# Patient Record
Sex: FEMALE | Race: WHITE | NOT HISPANIC OR LATINO | Employment: PART TIME | URBAN - METROPOLITAN AREA
[De-identification: names, ages, dates, MRNs, and addresses within clinical notes are randomized per-mention and may not be internally consistent; named-entity substitution may affect disease eponyms.]

---

## 2017-01-11 ENCOUNTER — ALLSCRIPTS OFFICE VISIT (OUTPATIENT)
Dept: OTHER | Facility: OTHER | Age: 22
End: 2017-01-11

## 2018-01-10 NOTE — MISCELLANEOUS
Message  Pt called office asking if she could move her 32 week u/s from this thursday to today given some pains shes been having  Pt states she has been having stabbing abdominal pain since Saturday  Pt was unable to describe location specifically saying " they are all over"  Also had difficulty describing how often  Pt denies any other Sx  Per CG pt to be seen in triage  L&D notified  Active Problems    1  1st trimester screening (V28 89) (Z36)   2  Depression with anxiety (300 4) (F41 8)   3  Encounter for fetal anatomic survey (V28 81) (Z36)   4  Encounter for screening for risk of pre-term labor (V28 82) (Z36)   5  Headache (784 0) (R51)   6  Pregnancy, first, obstetrical care (V22 0) (Z34 00)   7  Prenatal care in third trimester (V22 1) (Z34 93)    Current Meds   1  [facility] Prenatal Gummy 0 4-113 5 MG Oral Tablet Chewable; TAKE 2 TABLET Daily per pt; Therapy: (Recorded:[id number]) to Recorded    Allergies    1  No Known Drug Allergies    2  No Known Environmental Allergies   3   No Known Food Allergies    Signatures   Electronically signed by : Azalea Woody, ; Jan 11 2016 10:10AM EST                       (Author)

## 2018-01-13 NOTE — MISCELLANEOUS
Provider Comments  Provider Comments:   called patient and lmtcb and r/s      Signatures   Electronically signed by :  Brittany Menjivar, ; Jan 11 2017  2:49PM EST                       (Author)

## 2018-01-17 NOTE — CONSULTS
I had the pleasure of evaluating your patient, Josiah Sam  My full evaluation follows:      Chief Complaint  Here for ultrasound study      History of Present Illness  Please refer to the ultrasound report for information related to the patient's history      Active Problems    1  1st trimester screening (V28 89) (Z36)   2  Depression with anxiety (300 4) (F41 8)   3  Encounter for fetal anatomic survey (V28 81) (Z36)   4  Encounter for screening for risk of pre-term labor (V28 82) (Z36)   5  Headache (784 0) (R51)   6  Pregnancy, first, obstetrical care (V22 0) (Z34 00)   7  Prenatal care in third trimester (V22 1) (Z34 93)    Surgical History    · History of Jaw Surgery    Family History    · Family history of Kidney infection   · Family history of Sepsis due to Staphylococcus aureus with acute renal failure    Social History    · Always uses seat belt   ·    · Never a smoker    Current Meds   1  [facility]S Prenatal Gummy 0 4-113 5 MG Oral Tablet Chewable; TAKE 2 TABLET Daily per pt; Therapy: (Recorded:[id number]) to Recorded    Allergies    1  No Known Drug Allergies    2  No Known Environmental Allergies   3  No Known Food Allergies    Vitals   Recorded: [id number] 50:45YO   Systolic 820, LUE, Sitting   Diastolic 66, LUE, Sitting   Height 5 ft 2 in   2-20 Stature Percentile 19 %   Weight 158 lb 9 6 oz   2-20 Weight Percentile 87 %   BMI Calculated 29 01   BMI Percentile 92 %   BSA Calculated 1 73   Pain Scale 0     Results/Data  Exam description: level I obstetrical ultrasound  Findings: no fetal abnormalities seen  Discussion/Summary    Please refer to the ultrasound report for additional information  The patient was counseled regarding diagnostic results, instructions for management, prognosis, impressions  Thank you very much for allowing me to participate in the care of this patient  If you have any questions, please do not hesitate to contact me        Future Appointments    Signatures Electronically signed by : ASHLEY Walsh ; Jan 14 2016 10:52AM EST                       (Author)

## 2018-01-18 NOTE — PROGRESS NOTES
2016         RE: Helene Lam                                    To: [facility]  MR#: [id number]                                   [address]   : [address]   ENC: [id number]                             [place], [address]   (Exam #: [id number])                           Fax: [phone]      The LMP of this 24year old,  1, para 0 patient was 2015,   giving her an NAWAF of MAR 6 2016 and a current gestational age of 34 weeks   6 days by dates  A sonographic examination was performed on 2016   using real time equipment  The ultrasound examination was performed using   abdominal technique  The patient has a BMI of 28 2  Her blood pressure   today was 109/66  MFM ultrasound 09/03/15  13w 1d  16 NAWAF      Helene has no complaints today  She reports regular fetal movements and   denies problems related to hypertension, gestational diabetes,    labor, or vaginal bleeding        Cardiac motion was observed at 134 bpm       INDICATIONS      fetal growth   teen pregnancy      Exam Types      Level I      RESULTS      Fetus # 1 of 1   Vertex presentation   Fetal growth appeared normal   Placenta Location = Posterior, fundal   No placenta previa   Placenta Grade = II      AMNIOTIC FLUID      Q1: 3 7      Q2: 5 0      Q3: 2 7      Q4: 1 9   NATE Total = 13 2 cm   Amniotic Fluid: Normal      MEASUREMENTS (* Included In Average GA)      OFD             10 0 cm   AC              27 9 cm        32 weeks 0 days* (48%)   BPD              7 8 cm        31 weeks 2 days* (30%)   HC              28 5 cm        31 weeks 0 days* (16%)   Femur            5 9 cm        30 weeks 5 days* (29%)      Cerebellum       4 0 cm        33 weeks 4 days      HC/AC           1 02   FL/AC           0 21   FL/BPD          0 76   EFW (Ac/Fl/Hc)  1772 grams - 3 lbs 14 oz                 (38%)      THE AVERAGE GESTATIONAL AGE is 31 weeks 2 days +/- 18 days  FETAL VESSELS                                     S/D   PI    RI    PSV   AEDV RF                                                    cm/s       Umbilical Artery           2 18  0 78  0 54      IMPRESSION      Sunshine IUP   31 weeks and 2 days by this ultrasound  (NAWAF=MAR 15 2016)   Vertex presentation   Fetal growth appeared normal   Regular fetal heart rate of 134 bpm   Posterior, fundal placenta   No placenta previa      GENERAL COMMENT      No fetal structural abnormality is identified on the Level I survey today  The fetal brain, heart including the four-chamber, outflow tract, short   axis, and 3 vessel tracheal views, stomach, kidneys, bladder, and   diaphragm appear normal   Fetal interval growth and amniotic fluid volume   are normal       Today's ultrasound findings and suggested follow-up were discussed in   detail with Helene  Daily third trimester fetal kick counting was discussed   at the visit today  No further appointment has been scheduled in the   [address] [place] [address] AT [facility] at this time  Follow-up [facility] ultrasound   evaluation is recommended as clinically indicated  The face to face time, in addition to time spent discussing ultrasound   results, was 10 minutes, greater than 50% of which was spent during   counseling and coordination of care  RYNE Murphy Ala M D     Maternal-Fetal Medicine   Electronically signed 01/14/16 10:50